# Patient Record
Sex: MALE | Race: WHITE
[De-identification: names, ages, dates, MRNs, and addresses within clinical notes are randomized per-mention and may not be internally consistent; named-entity substitution may affect disease eponyms.]

---

## 2018-02-28 ENCOUNTER — HOSPITAL ENCOUNTER (OUTPATIENT)
Age: 38
End: 2018-02-28
Payer: MEDICARE

## 2018-02-28 DIAGNOSIS — F11.20: Primary | ICD-10-CM

## 2018-02-28 LAB
AMPHET UR-MCNC: NEGATIVE NG/ML
BARBITURATE URINE VISTA: NEGATIVE
BENZODIAZEPINE URINE VISTA: NEGATIVE
COCAINE URINE VISTA: NEGATIVE
DRUG CONFIRMATION TO FOLLOW?: (no result)
ECSTACY URINE VISTA: NEGATIVE
METHADONE URINE VISTA: NEGATIVE
PCP UR QL: NEGATIVE
PH UR: 7 [PH]
THC URINE VISTA: NEGATIVE

## 2018-02-28 PROCEDURE — 80307 DRUG TEST PRSMV CHEM ANLYZR: CPT

## 2019-01-15 ENCOUNTER — HOSPITAL ENCOUNTER (INPATIENT)
Age: 39
LOS: 2 days | Discharge: HOME | DRG: 92 | End: 2019-01-17
Payer: MEDICARE

## 2019-01-15 VITALS
RESPIRATION RATE: 16 BRPM | TEMPERATURE: 97.8 F | DIASTOLIC BLOOD PRESSURE: 68 MMHG | SYSTOLIC BLOOD PRESSURE: 114 MMHG | HEART RATE: 86 BPM | OXYGEN SATURATION: 99 %

## 2019-01-15 VITALS
DIASTOLIC BLOOD PRESSURE: 76 MMHG | OXYGEN SATURATION: 100 % | RESPIRATION RATE: 16 BRPM | TEMPERATURE: 98.3 F | HEART RATE: 82 BPM | SYSTOLIC BLOOD PRESSURE: 111 MMHG

## 2019-01-15 VITALS
HEART RATE: 83 BPM | DIASTOLIC BLOOD PRESSURE: 78 MMHG | OXYGEN SATURATION: 100 % | TEMPERATURE: 98.24 F | RESPIRATION RATE: 16 BRPM | SYSTOLIC BLOOD PRESSURE: 119 MMHG

## 2019-01-15 VITALS
OXYGEN SATURATION: 99 % | HEART RATE: 83 BPM | RESPIRATION RATE: 16 BRPM | TEMPERATURE: 98.42 F | SYSTOLIC BLOOD PRESSURE: 120 MMHG | DIASTOLIC BLOOD PRESSURE: 71 MMHG

## 2019-01-15 VITALS — BODY MASS INDEX: 29.9 KG/M2 | BODY MASS INDEX: 26.9 KG/M2

## 2019-01-15 DIAGNOSIS — Z72.0: ICD-10-CM

## 2019-01-15 DIAGNOSIS — F07.81: ICD-10-CM

## 2019-01-15 DIAGNOSIS — S79.912S: ICD-10-CM

## 2019-01-15 DIAGNOSIS — F90.9: ICD-10-CM

## 2019-01-15 DIAGNOSIS — S79.922S: ICD-10-CM

## 2019-01-15 DIAGNOSIS — G89.4: Primary | ICD-10-CM

## 2019-01-15 DIAGNOSIS — M79.652: ICD-10-CM

## 2019-01-15 DIAGNOSIS — M25.552: ICD-10-CM

## 2019-01-15 DIAGNOSIS — T85.620A: ICD-10-CM

## 2019-01-15 DIAGNOSIS — F32.9: ICD-10-CM
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
## 2019-01-15 PROCEDURE — 36415 COLL VENOUS BLD VENIPUNCTURE: CPT

## 2019-01-15 PROCEDURE — 85025 COMPLETE CBC W/AUTO DIFF WBC: CPT

## 2019-01-15 PROCEDURE — 86140 C-REACTIVE PROTEIN: CPT

## 2019-01-15 PROCEDURE — 85652 RBC SED RATE AUTOMATED: CPT

## 2019-01-16 VITALS
TEMPERATURE: 98.2 F | DIASTOLIC BLOOD PRESSURE: 82 MMHG | RESPIRATION RATE: 16 BRPM | OXYGEN SATURATION: 100 % | HEART RATE: 77 BPM | SYSTOLIC BLOOD PRESSURE: 128 MMHG

## 2019-01-16 VITALS
HEART RATE: 84 BPM | DIASTOLIC BLOOD PRESSURE: 83 MMHG | SYSTOLIC BLOOD PRESSURE: 134 MMHG | OXYGEN SATURATION: 98 % | RESPIRATION RATE: 16 BRPM

## 2019-01-16 VITALS — RESPIRATION RATE: 16 BRPM | OXYGEN SATURATION: 96 %

## 2019-01-16 VITALS
OXYGEN SATURATION: 100 % | DIASTOLIC BLOOD PRESSURE: 82 MMHG | RESPIRATION RATE: 16 BRPM | TEMPERATURE: 98.1 F | HEART RATE: 80 BPM | SYSTOLIC BLOOD PRESSURE: 131 MMHG

## 2019-01-16 VITALS
SYSTOLIC BLOOD PRESSURE: 117 MMHG | RESPIRATION RATE: 16 BRPM | HEART RATE: 70 BPM | TEMPERATURE: 98.24 F | DIASTOLIC BLOOD PRESSURE: 65 MMHG | OXYGEN SATURATION: 100 %

## 2019-01-16 VITALS
SYSTOLIC BLOOD PRESSURE: 120 MMHG | HEART RATE: 62 BPM | DIASTOLIC BLOOD PRESSURE: 62 MMHG | OXYGEN SATURATION: 100 % | TEMPERATURE: 97.88 F | RESPIRATION RATE: 16 BRPM

## 2019-01-16 VITALS
HEART RATE: 80 BPM | SYSTOLIC BLOOD PRESSURE: 127 MMHG | DIASTOLIC BLOOD PRESSURE: 77 MMHG | OXYGEN SATURATION: 100 % | RESPIRATION RATE: 16 BRPM

## 2019-01-16 VITALS
RESPIRATION RATE: 16 BRPM | SYSTOLIC BLOOD PRESSURE: 134 MMHG | HEART RATE: 90 BPM | DIASTOLIC BLOOD PRESSURE: 74 MMHG | OXYGEN SATURATION: 100 %

## 2019-01-16 VITALS
RESPIRATION RATE: 16 BRPM | DIASTOLIC BLOOD PRESSURE: 83 MMHG | SYSTOLIC BLOOD PRESSURE: 111 MMHG | HEART RATE: 77 BPM | OXYGEN SATURATION: 100 % | TEMPERATURE: 97.88 F

## 2019-01-16 VITALS
TEMPERATURE: 97.88 F | DIASTOLIC BLOOD PRESSURE: 86 MMHG | RESPIRATION RATE: 16 BRPM | SYSTOLIC BLOOD PRESSURE: 116 MMHG | OXYGEN SATURATION: 100 % | HEART RATE: 78 BPM

## 2019-01-16 VITALS
DIASTOLIC BLOOD PRESSURE: 76 MMHG | SYSTOLIC BLOOD PRESSURE: 137 MMHG | RESPIRATION RATE: 16 BRPM | OXYGEN SATURATION: 98 % | HEART RATE: 77 BPM

## 2019-01-16 VITALS
HEART RATE: 73 BPM | RESPIRATION RATE: 16 BRPM | TEMPERATURE: 98.42 F | OXYGEN SATURATION: 100 % | DIASTOLIC BLOOD PRESSURE: 73 MMHG | SYSTOLIC BLOOD PRESSURE: 119 MMHG

## 2019-01-16 VITALS
RESPIRATION RATE: 16 BRPM | DIASTOLIC BLOOD PRESSURE: 87 MMHG | HEART RATE: 89 BPM | SYSTOLIC BLOOD PRESSURE: 147 MMHG | OXYGEN SATURATION: 99 %

## 2019-01-16 VITALS
HEART RATE: 90 BPM | SYSTOLIC BLOOD PRESSURE: 139 MMHG | OXYGEN SATURATION: 100 % | DIASTOLIC BLOOD PRESSURE: 82 MMHG | TEMPERATURE: 98.1 F | RESPIRATION RATE: 16 BRPM

## 2019-01-16 VITALS
OXYGEN SATURATION: 99 % | DIASTOLIC BLOOD PRESSURE: 85 MMHG | RESPIRATION RATE: 16 BRPM | SYSTOLIC BLOOD PRESSURE: 142 MMHG | HEART RATE: 78 BPM

## 2019-01-16 VITALS
DIASTOLIC BLOOD PRESSURE: 69 MMHG | RESPIRATION RATE: 16 BRPM | OXYGEN SATURATION: 96 % | SYSTOLIC BLOOD PRESSURE: 131 MMHG | HEART RATE: 92 BPM

## 2019-01-16 VITALS
TEMPERATURE: 98.24 F | OXYGEN SATURATION: 100 % | DIASTOLIC BLOOD PRESSURE: 68 MMHG | RESPIRATION RATE: 16 BRPM | HEART RATE: 74 BPM | SYSTOLIC BLOOD PRESSURE: 123 MMHG

## 2019-01-16 VITALS — OXYGEN SATURATION: 99 % | RESPIRATION RATE: 16 BRPM

## 2019-01-16 VITALS
SYSTOLIC BLOOD PRESSURE: 142 MMHG | HEART RATE: 78 BPM | DIASTOLIC BLOOD PRESSURE: 86 MMHG | RESPIRATION RATE: 16 BRPM | TEMPERATURE: 98.06 F | OXYGEN SATURATION: 100 %

## 2019-01-16 VITALS
SYSTOLIC BLOOD PRESSURE: 126 MMHG | OXYGEN SATURATION: 100 % | RESPIRATION RATE: 16 BRPM | HEART RATE: 82 BPM | DIASTOLIC BLOOD PRESSURE: 88 MMHG | TEMPERATURE: 98.4 F

## 2019-01-16 VITALS — RESPIRATION RATE: 16 BRPM

## 2019-01-16 LAB
CRP SERPL-MCNC: < 2.9 MG/L (ref 0–3)
DEPRECATED RDW RBC: 44 FL (ref 35.1–43.9)
DIFFERENTIAL INDICATED: (no result)
ERYTHROCYTE [DISTWIDTH] IN BLOOD: 13.2 % (ref 11.6–14.6)
HCT VFR BLD AUTO: 47.5 % (ref 40–54)
HEMOGLOBIN: 16.5 G/DL (ref 13–16.5)
HGB BLD-MCNC: 16.5 G/DL (ref 13–16.5)
IMMATURE GRANULOCYTES COUNT: 0.03 X10^3/UL (ref 0–0)
MCV RBC: 90.8 FL (ref 80–94)
MEAN CORP HGB CONC: 34.7 G/GL (ref 32–36)
MEAN PLATELET VOL.: 9.8 FL (ref 6.2–12)
PLATELET # BLD: 285 K/MM3 (ref 150–450)
PLATELET COUNT: 285 K/MM3 (ref 150–450)
POSITIVE COUNT: NO
POSITIVE DIFFERENTIAL: NO
POSITIVE MORPHOLOGY: NO
RBC # BLD AUTO: 5.23 M/MM3 (ref 4.6–6.2)
RBC DISTRIBUTION WIDTH CV: 13.2 % (ref 11.6–14.6)
RBC DISTRIBUTION WIDTH SD: 44 FL (ref 35.1–43.9)
WBC # BLD AUTO: 14.8 K/MM3 (ref 4.4–11)
WHITE BLOOD COUNT: 14.8 K/MM3 (ref 4.4–11)

## 2019-01-17 VITALS
TEMPERATURE: 97.5 F | DIASTOLIC BLOOD PRESSURE: 87 MMHG | HEART RATE: 81 BPM | RESPIRATION RATE: 16 BRPM | OXYGEN SATURATION: 99 % | SYSTOLIC BLOOD PRESSURE: 124 MMHG

## 2019-01-17 VITALS
DIASTOLIC BLOOD PRESSURE: 79 MMHG | TEMPERATURE: 97.6 F | HEART RATE: 103 BPM | RESPIRATION RATE: 18 BRPM | OXYGEN SATURATION: 99 % | SYSTOLIC BLOOD PRESSURE: 129 MMHG

## 2019-02-22 ENCOUNTER — HOSPITAL ENCOUNTER (OUTPATIENT)
Dept: HOSPITAL 100 - SDC | Age: 39
Discharge: HOME | End: 2019-02-22
Payer: MEDICARE

## 2019-02-22 VITALS
HEART RATE: 69 BPM | DIASTOLIC BLOOD PRESSURE: 82 MMHG | TEMPERATURE: 98.42 F | SYSTOLIC BLOOD PRESSURE: 112 MMHG | RESPIRATION RATE: 16 BRPM | OXYGEN SATURATION: 100 %

## 2019-02-22 VITALS
DIASTOLIC BLOOD PRESSURE: 75 MMHG | TEMPERATURE: 98.24 F | OXYGEN SATURATION: 100 % | RESPIRATION RATE: 16 BRPM | SYSTOLIC BLOOD PRESSURE: 112 MMHG | HEART RATE: 68 BPM

## 2019-02-22 VITALS
RESPIRATION RATE: 16 BRPM | DIASTOLIC BLOOD PRESSURE: 75 MMHG | TEMPERATURE: 97.88 F | HEART RATE: 73 BPM | OXYGEN SATURATION: 100 % | SYSTOLIC BLOOD PRESSURE: 112 MMHG

## 2019-02-22 VITALS
HEART RATE: 71 BPM | OXYGEN SATURATION: 100 % | SYSTOLIC BLOOD PRESSURE: 127 MMHG | TEMPERATURE: 98.9 F | RESPIRATION RATE: 16 BRPM | DIASTOLIC BLOOD PRESSURE: 69 MMHG

## 2019-02-22 VITALS
SYSTOLIC BLOOD PRESSURE: 108 MMHG | RESPIRATION RATE: 16 BRPM | HEART RATE: 74 BPM | DIASTOLIC BLOOD PRESSURE: 89 MMHG | OXYGEN SATURATION: 100 %

## 2019-02-22 VITALS — BODY MASS INDEX: 26.9 KG/M2 | BODY MASS INDEX: 27.1 KG/M2

## 2019-02-22 VITALS
OXYGEN SATURATION: 100 % | RESPIRATION RATE: 16 BRPM | SYSTOLIC BLOOD PRESSURE: 118 MMHG | HEART RATE: 70 BPM | DIASTOLIC BLOOD PRESSURE: 75 MMHG

## 2019-02-22 DIAGNOSIS — Z79.891: ICD-10-CM

## 2019-02-22 DIAGNOSIS — G89.4: Primary | ICD-10-CM

## 2019-02-22 DIAGNOSIS — M54.16: ICD-10-CM

## 2019-02-22 DIAGNOSIS — Z79.2: ICD-10-CM

## 2019-02-22 DIAGNOSIS — Z79.899: ICD-10-CM

## 2019-02-22 DIAGNOSIS — F17.220: ICD-10-CM

## 2019-02-22 DIAGNOSIS — M54.5: ICD-10-CM

## 2019-02-22 DIAGNOSIS — M96.1: ICD-10-CM

## 2019-02-22 PROCEDURE — 72100 X-RAY EXAM L-S SPINE 2/3 VWS: CPT

## 2019-02-22 PROCEDURE — 76000 FLUOROSCOPY <1 HR PHYS/QHP: CPT

## 2019-02-22 PROCEDURE — 62362 IMPLANT SPINE INFUSION PUMP: CPT

## 2019-02-22 PROCEDURE — 62350 IMPLANT SPINAL CANAL CATH: CPT

## 2019-02-22 RX ADMIN — CEFAZOLIN 150 GM: 10 INJECTION, POWDER, FOR SOLUTION INTRAVENOUS at 15:27

## 2019-02-22 RX ADMIN — VANCOMYCIN HYDROCHLORIDE 200 MG: 1 INJECTION, SOLUTION INTRAVENOUS at 15:22

## 2020-04-11 ENCOUNTER — HOSPITAL ENCOUNTER (EMERGENCY)
Dept: HOSPITAL 100 - ED | Age: 40
Discharge: HOME | End: 2020-04-11
Payer: MEDICARE

## 2020-04-11 VITALS
SYSTOLIC BLOOD PRESSURE: 158 MMHG | DIASTOLIC BLOOD PRESSURE: 101 MMHG | OXYGEN SATURATION: 99 % | TEMPERATURE: 98.1 F | RESPIRATION RATE: 18 BRPM | HEART RATE: 103 BPM

## 2020-04-11 VITALS — BODY MASS INDEX: 27.1 KG/M2 | BODY MASS INDEX: 31.6 KG/M2

## 2020-04-11 DIAGNOSIS — L30.9: Primary | ICD-10-CM

## 2020-04-11 DIAGNOSIS — Z72.0: ICD-10-CM

## 2020-04-11 DIAGNOSIS — F41.9: ICD-10-CM

## 2020-04-11 DIAGNOSIS — R03.0: ICD-10-CM

## 2020-04-11 LAB
ANION GAP: 7 (ref 5–15)
BUN SERPL-MCNC: 9 MG/DL (ref 7–18)
BUN/CREAT RATIO: 15 RATIO (ref 10–20)
CALCIUM SERPL-MCNC: 9 MG/DL (ref 8.5–10.1)
CARBON DIOXIDE: 25 MMOL/L (ref 21–32)
CHLORIDE: 106 MMOL/L (ref 98–107)
DEPRECATED RDW RBC: 41.9 FL (ref 35.1–43.9)
ERYTHROCYTE [DISTWIDTH] IN BLOOD: 12.8 % (ref 11.6–14.6)
EST GLOM FILT RATE - AFR AMER: 194 ML/MIN (ref 60–?)
ESTIMATED CREATININE CLEARANCE: 168.57 ML/MIN
GLUCOSE: 89 MG/DL (ref 74–106)
HCT VFR BLD AUTO: 44.3 % (ref 40–54)
HEMOGLOBIN: 15.2 G/DL (ref 13–16.5)
HGB BLD-MCNC: 15.2 G/DL (ref 13–16.5)
IMMATURE GRANULOCYTES COUNT: 0.03 X10^3/UL (ref 0–0)
MCV RBC: 89.5 FL (ref 80–94)
MEAN CORP HGB CONC: 34.3 G/DL (ref 32–36)
MEAN PLATELET VOL.: 9.8 FL (ref 6.2–12)
NRBC FLAGGED BY ANALYZER: 0 % (ref 0–5)
PLATELET # BLD: 226 K/MM3 (ref 150–450)
PLATELET COUNT: 226 K/MM3 (ref 150–450)
POTASSIUM: 4.2 MMOL/L (ref 3.5–5.1)
RBC # BLD AUTO: 4.95 M/MM3 (ref 4.6–6.2)
RBC DISTRIBUTION WIDTH CV: 12.8 % (ref 11.6–14.6)
RBC DISTRIBUTION WIDTH SD: 41.9 FL (ref 35.1–43.9)
WBC # BLD AUTO: 8.8 K/MM3 (ref 4.4–11)
WHITE BLOOD COUNT: 8.8 K/MM3 (ref 4.4–11)

## 2020-04-11 PROCEDURE — 96374 THER/PROPH/DIAG INJ IV PUSH: CPT

## 2020-04-11 PROCEDURE — 93005 ELECTROCARDIOGRAM TRACING: CPT

## 2020-04-11 PROCEDURE — 85025 COMPLETE CBC W/AUTO DIFF WBC: CPT

## 2020-04-11 PROCEDURE — 70450 CT HEAD/BRAIN W/O DYE: CPT

## 2020-04-11 PROCEDURE — A4216 STERILE WATER/SALINE, 10 ML: HCPCS

## 2020-04-11 PROCEDURE — 96361 HYDRATE IV INFUSION ADD-ON: CPT

## 2020-04-11 PROCEDURE — 99285 EMERGENCY DEPT VISIT HI MDM: CPT

## 2020-04-11 PROCEDURE — 80048 BASIC METABOLIC PNL TOTAL CA: CPT

## 2020-04-11 RX ADMIN — SODIUM CHLORIDE 150 ML: 9 INJECTION, SOLUTION INTRAVENOUS at 12:04

## 2020-06-08 ENCOUNTER — HOSPITAL ENCOUNTER (EMERGENCY)
Age: 40
Discharge: HOME | End: 2020-06-08
Payer: MEDICARE

## 2020-06-08 VITALS
RESPIRATION RATE: 16 BRPM | SYSTOLIC BLOOD PRESSURE: 125 MMHG | HEART RATE: 98 BPM | OXYGEN SATURATION: 98 % | DIASTOLIC BLOOD PRESSURE: 85 MMHG

## 2020-06-08 VITALS
RESPIRATION RATE: 20 BRPM | HEART RATE: 147 BPM | SYSTOLIC BLOOD PRESSURE: 192 MMHG | TEMPERATURE: 97.88 F | DIASTOLIC BLOOD PRESSURE: 105 MMHG | OXYGEN SATURATION: 99 %

## 2020-06-08 VITALS
SYSTOLIC BLOOD PRESSURE: 125 MMHG | RESPIRATION RATE: 16 BRPM | HEART RATE: 111 BPM | OXYGEN SATURATION: 99 % | TEMPERATURE: 97.88 F | DIASTOLIC BLOOD PRESSURE: 71 MMHG

## 2020-06-08 VITALS — BODY MASS INDEX: 29 KG/M2 | BODY MASS INDEX: 31.6 KG/M2

## 2020-06-08 DIAGNOSIS — F15.10: Primary | ICD-10-CM

## 2020-06-08 DIAGNOSIS — G89.29: ICD-10-CM

## 2020-06-08 DIAGNOSIS — Z79.891: ICD-10-CM

## 2020-06-08 LAB
ALANINE AMINOTRANSFER ALT/SGPT: 50 U/L (ref 16–61)
ALBUMIN SERPL-MCNC: 4.4 G/DL (ref 3.2–5)
ALCOHOL, BLOOD (MEDICAL)-SERUM: < 3 MG/DL
ALKALINE PHOSPHATASE: 94 U/L (ref 45–117)
ANION GAP: 7 (ref 5–15)
AST(SGOT): 31 U/L (ref 15–37)
BUN SERPL-MCNC: 18 MG/DL (ref 7–18)
BUN/CREAT RATIO: 21.6 RATIO (ref 10–20)
CALCIUM SERPL-MCNC: 9.3 MG/DL (ref 8.5–10.1)
CARBON DIOXIDE: 26 MMOL/L (ref 21–32)
CHLORIDE: 105 MMOL/L (ref 98–107)
DEPRECATED RDW RBC: 41.6 FL (ref 35.1–43.9)
ERYTHROCYTE [DISTWIDTH] IN BLOOD: 12.9 % (ref 11.6–14.6)
EST GLOM FILT RATE - AFR AMER: 133 ML/MIN (ref 60–?)
ESTIMATED CREATININE CLEARANCE: 109.96 ML/MIN
GLOBULIN: 3.7 G/DL (ref 2.2–4.2)
GLUCOSE: 93 MG/DL (ref 74–106)
HCT VFR BLD AUTO: 46.3 % (ref 40–54)
HEMOGLOBIN: 15.9 G/DL (ref 13–16.5)
HGB BLD-MCNC: 15.9 G/DL (ref 13–16.5)
IMMATURE GRANULOCYTES COUNT: 0.02 X10^3/UL (ref 0–0)
MCV RBC: 88.4 FL (ref 80–94)
MEAN CORP HGB CONC: 34.3 G/DL (ref 32–36)
MEAN PLATELET VOL.: 9.3 FL (ref 6.2–12)
NRBC FLAGGED BY ANALYZER: 0 % (ref 0–5)
PLATELET # BLD: 279 K/MM3 (ref 150–450)
PLATELET COUNT: 279 K/MM3 (ref 150–450)
POTASSIUM: 3.6 MMOL/L (ref 3.5–5.1)
RBC # BLD AUTO: 5.24 M/MM3 (ref 4.6–6.2)
RBC DISTRIBUTION WIDTH CV: 12.9 % (ref 11.6–14.6)
RBC DISTRIBUTION WIDTH SD: 41.6 FL (ref 35.1–43.9)
WBC # BLD AUTO: 8.3 K/MM3 (ref 4.4–11)
WHITE BLOOD COUNT: 8.3 K/MM3 (ref 4.4–11)

## 2020-06-08 PROCEDURE — 96374 THER/PROPH/DIAG INJ IV PUSH: CPT

## 2020-06-08 PROCEDURE — 93005 ELECTROCARDIOGRAM TRACING: CPT

## 2020-06-08 PROCEDURE — 99285 EMERGENCY DEPT VISIT HI MDM: CPT

## 2020-06-08 PROCEDURE — 85025 COMPLETE CBC W/AUTO DIFF WBC: CPT

## 2020-06-08 PROCEDURE — 96361 HYDRATE IV INFUSION ADD-ON: CPT

## 2020-06-08 PROCEDURE — 80320 DRUG SCREEN QUANTALCOHOLS: CPT

## 2020-06-08 PROCEDURE — G0480 DRUG TEST DEF 1-7 CLASSES: HCPCS

## 2020-06-08 PROCEDURE — 80053 COMPREHEN METABOLIC PANEL: CPT

## 2020-07-10 ENCOUNTER — HOSPITAL ENCOUNTER (EMERGENCY)
Dept: HOSPITAL 100 - ED | Age: 40
Discharge: LEFT BEFORE DISCHARGE | End: 2020-07-10
Payer: MEDICARE

## 2020-07-10 ENCOUNTER — HOSPITAL ENCOUNTER (EMERGENCY)
Age: 40
LOS: 1 days | Discharge: HOME | End: 2020-07-11
Payer: MEDICARE

## 2020-07-10 VITALS
TEMPERATURE: 97.88 F | RESPIRATION RATE: 20 BRPM | HEART RATE: 127 BPM | OXYGEN SATURATION: 100 % | DIASTOLIC BLOOD PRESSURE: 99 MMHG | SYSTOLIC BLOOD PRESSURE: 154 MMHG

## 2020-07-10 VITALS
RESPIRATION RATE: 18 BRPM | DIASTOLIC BLOOD PRESSURE: 74 MMHG | HEART RATE: 96 BPM | SYSTOLIC BLOOD PRESSURE: 104 MMHG | OXYGEN SATURATION: 93 %

## 2020-07-10 VITALS
RESPIRATION RATE: 16 BRPM | OXYGEN SATURATION: 98 % | SYSTOLIC BLOOD PRESSURE: 104 MMHG | HEART RATE: 83 BPM | DIASTOLIC BLOOD PRESSURE: 72 MMHG

## 2020-07-10 VITALS
DIASTOLIC BLOOD PRESSURE: 73 MMHG | HEART RATE: 105 BPM | OXYGEN SATURATION: 94 % | SYSTOLIC BLOOD PRESSURE: 118 MMHG | RESPIRATION RATE: 15 BRPM

## 2020-07-10 VITALS
OXYGEN SATURATION: 98 % | HEART RATE: 130 BPM | SYSTOLIC BLOOD PRESSURE: 154 MMHG | RESPIRATION RATE: 20 BRPM | DIASTOLIC BLOOD PRESSURE: 99 MMHG

## 2020-07-10 VITALS — BODY MASS INDEX: 33 KG/M2 | BODY MASS INDEX: 33.1 KG/M2

## 2020-07-10 VITALS — BODY MASS INDEX: 33 KG/M2 | BODY MASS INDEX: 29 KG/M2

## 2020-07-10 VITALS
OXYGEN SATURATION: 98 % | TEMPERATURE: 98.4 F | RESPIRATION RATE: 20 BRPM | SYSTOLIC BLOOD PRESSURE: 153 MMHG | DIASTOLIC BLOOD PRESSURE: 118 MMHG | HEART RATE: 139 BPM

## 2020-07-10 DIAGNOSIS — F41.9: ICD-10-CM

## 2020-07-10 DIAGNOSIS — R44.2: Primary | ICD-10-CM

## 2020-07-10 DIAGNOSIS — F41.9: Primary | ICD-10-CM

## 2020-07-10 DIAGNOSIS — R00.0: ICD-10-CM

## 2020-07-10 DIAGNOSIS — F17.200: ICD-10-CM

## 2020-07-10 LAB
ALANINE AMINOTRANSFER ALT/SGPT: 59 U/L (ref 16–61)
ALBUMIN SERPL-MCNC: 4.4 G/DL (ref 3.2–5)
ALCOHOL, BLOOD (MEDICAL)-SERUM: 3 MG/DL
ALCOHOL, BLOOD (MEDICAL)-SERUM: 4 MG/DL
ALKALINE PHOSPHATASE: 90 U/L (ref 45–117)
AMPHET UR-MCNC: POSITIVE NG/ML
ANION GAP: 8 (ref 5–15)
AST(SGOT): 42 U/L (ref 15–37)
BARBITURATE URINE VISTA: NEGATIVE
BENZODIAZEPINE URINE VISTA: NEGATIVE
BILIRUB UR QL STRIP: 1 MG/DL
BUN SERPL-MCNC: 6 MG/DL (ref 7–18)
BUN/CREAT RATIO: 7.7 RATIO (ref 10–20)
CALCIUM SERPL-MCNC: 9 MG/DL (ref 8.5–10.1)
CARBON DIOXIDE: 23 MMOL/L (ref 21–32)
CHLORIDE: 105 MMOL/L (ref 98–107)
COCAINE URINE VISTA: NEGATIVE
DEPRECATED RDW RBC: 40.1 FL (ref 35.1–43.9)
DRUG CONFIRMATION TO FOLLOW?: (no result)
ECSTACY URINE VISTA: NEGATIVE
ERYTHROCYTE [DISTWIDTH] IN BLOOD: 13 % (ref 11.6–14.6)
EST GLOM FILT RATE - AFR AMER: 143 ML/MIN (ref 60–?)
ESTIMATED CREATININE CLEARANCE: 117.01 ML/MIN
GLOBULIN: 3.7 G/DL (ref 2.2–4.2)
GLUCOSE: 110 MG/DL (ref 74–106)
HCT VFR BLD AUTO: 46.5 % (ref 40–54)
HEMOGLOBIN: 16.2 G/DL (ref 13–16.5)
HGB BLD-MCNC: 16.2 G/DL (ref 13–16.5)
IMMATURE GRANULOCYTES COUNT: 0.03 X10^3/UL (ref 0–0)
KETONE-DIPSTICK: 50 MG/DL
LEUKOCYTE ESTERASE UR QL STRIP: 25 /UL
MCV RBC: 86.6 FL (ref 80–94)
MEAN CORP HGB CONC: 34.8 G/DL (ref 32–36)
MEAN PLATELET VOL.: 9.6 FL (ref 6.2–12)
METHADONE URINE VISTA: NEGATIVE
MUCOUS THREADS URNS QL MICRO: (no result) /HPF
NRBC FLAGGED BY ANALYZER: 0 % (ref 0–5)
PCP UR QL: NEGATIVE
PH UR: 6 [PH]
PLATELET # BLD: 361 K/MM3 (ref 150–450)
PLATELET COUNT: 361 K/MM3 (ref 150–450)
POTASSIUM: 3.6 MMOL/L (ref 3.5–5.1)
PROT UR QL STRIP.AUTO: 30 MG/DL
RBC # BLD AUTO: 5.37 M/MM3 (ref 4.6–6.2)
RBC DISTRIBUTION WIDTH CV: 13 % (ref 11.6–14.6)
RBC DISTRIBUTION WIDTH SD: 40.1 FL (ref 35.1–43.9)
RBC UR QL: (no result) /HPF (ref 0–5)
SP GR UR: 1.01 (ref 1–1.03)
SQUAMOUS URNS QL MICRO: (no result) /HPF (ref 0–5)
THC URINE VISTA: NEGATIVE
URINE PRESERVATIVE: (no result)
WBC # BLD AUTO: 7.9 K/MM3 (ref 4.4–11)
WHITE BLOOD COUNT: 7.9 K/MM3 (ref 4.4–11)

## 2020-07-10 PROCEDURE — 36415 COLL VENOUS BLD VENIPUNCTURE: CPT

## 2020-07-10 PROCEDURE — 80320 DRUG SCREEN QUANTALCOHOLS: CPT

## 2020-07-10 PROCEDURE — 80307 DRUG TEST PRSMV CHEM ANLYZR: CPT

## 2020-07-10 PROCEDURE — 99282 EMERGENCY DEPT VISIT SF MDM: CPT

## 2020-07-10 PROCEDURE — 71045 X-RAY EXAM CHEST 1 VIEW: CPT

## 2020-07-10 PROCEDURE — 85025 COMPLETE CBC W/AUTO DIFF WBC: CPT

## 2020-07-10 PROCEDURE — G0480 DRUG TEST DEF 1-7 CLASSES: HCPCS

## 2020-07-10 PROCEDURE — 80053 COMPREHEN METABOLIC PANEL: CPT

## 2020-07-10 PROCEDURE — 84484 ASSAY OF TROPONIN QUANT: CPT

## 2020-07-10 PROCEDURE — 96372 THER/PROPH/DIAG INJ SC/IM: CPT

## 2020-07-10 PROCEDURE — 70450 CT HEAD/BRAIN W/O DYE: CPT

## 2020-07-10 PROCEDURE — 81001 URINALYSIS AUTO W/SCOPE: CPT

## 2020-07-10 PROCEDURE — 84443 ASSAY THYROID STIM HORMONE: CPT

## 2020-07-10 PROCEDURE — A4216 STERILE WATER/SALINE, 10 ML: HCPCS

## 2020-07-10 PROCEDURE — 99285 EMERGENCY DEPT VISIT HI MDM: CPT

## 2020-07-10 PROCEDURE — 93005 ELECTROCARDIOGRAM TRACING: CPT

## 2020-07-11 VITALS
DIASTOLIC BLOOD PRESSURE: 75 MMHG | RESPIRATION RATE: 16 BRPM | SYSTOLIC BLOOD PRESSURE: 133 MMHG | OXYGEN SATURATION: 95 % | HEART RATE: 85 BPM

## 2020-07-11 VITALS
SYSTOLIC BLOOD PRESSURE: 118 MMHG | HEART RATE: 107 BPM | RESPIRATION RATE: 18 BRPM | OXYGEN SATURATION: 97 % | DIASTOLIC BLOOD PRESSURE: 63 MMHG

## 2020-07-11 VITALS
SYSTOLIC BLOOD PRESSURE: 124 MMHG | HEART RATE: 88 BPM | RESPIRATION RATE: 16 BRPM | OXYGEN SATURATION: 95 % | DIASTOLIC BLOOD PRESSURE: 76 MMHG

## 2020-07-11 VITALS — RESPIRATION RATE: 16 BRPM

## 2020-07-11 VITALS
OXYGEN SATURATION: 96 % | HEART RATE: 88 BPM | DIASTOLIC BLOOD PRESSURE: 88 MMHG | RESPIRATION RATE: 16 BRPM | SYSTOLIC BLOOD PRESSURE: 125 MMHG

## 2021-03-22 ENCOUNTER — HOSPITAL ENCOUNTER (OUTPATIENT)
Dept: HOSPITAL 100 - RAD | Age: 41
End: 2021-03-22
Payer: MEDICARE

## 2021-03-22 VITALS — BODY MASS INDEX: 33.1 KG/M2

## 2021-03-22 DIAGNOSIS — M54.2: Primary | ICD-10-CM

## 2021-03-22 PROCEDURE — 72070 X-RAY EXAM THORAC SPINE 2VWS: CPT

## 2021-03-22 PROCEDURE — 72040 X-RAY EXAM NECK SPINE 2-3 VW: CPT

## 2021-11-11 ENCOUNTER — HOSPITAL ENCOUNTER (OUTPATIENT)
Age: 41
End: 2021-11-11
Payer: MEDICARE

## 2021-11-11 DIAGNOSIS — M54.2: Primary | ICD-10-CM

## 2021-11-11 PROCEDURE — 72141 MRI NECK SPINE W/O DYE: CPT

## 2022-08-17 ENCOUNTER — HOSPITAL ENCOUNTER (OUTPATIENT)
Dept: HOSPITAL 100 - LAB | Age: 42
Discharge: HOME | End: 2022-08-17
Payer: MEDICARE

## 2022-08-17 DIAGNOSIS — F11.20: Primary | ICD-10-CM

## 2022-08-17 PROCEDURE — 80307 DRUG TEST PRSMV CHEM ANLYZR: CPT

## 2023-01-31 ENCOUNTER — HOSPITAL ENCOUNTER (OUTPATIENT)
Dept: HOSPITAL 100 - LAB | Age: 43
Discharge: HOME | End: 2023-01-31
Payer: MEDICARE

## 2023-01-31 DIAGNOSIS — F11.20: Primary | ICD-10-CM

## 2023-01-31 LAB
AMPHET UR-MCNC: NEGATIVE NG/ML
BARBITURATE URINE VISTA: NEGATIVE
BENZODIAZEPINE URINE VISTA: NEGATIVE
COCAINE URINE VISTA: NEGATIVE
DRUG CONFIRMATION TO FOLLOW?: (no result)
ECSTACY URINE VISTA: NEGATIVE
METHADONE URINE VISTA: NEGATIVE
PCP UR QL: NEGATIVE
PH UR: 5 [PH]
THC URINE VISTA: POSITIVE

## 2023-01-31 PROCEDURE — 80307 DRUG TEST PRSMV CHEM ANLYZR: CPT

## 2024-04-26 DIAGNOSIS — Z87.898 HISTORY OF ABNORMAL WEIGHT LOSS: Primary | ICD-10-CM

## 2024-06-17 ENCOUNTER — APPOINTMENT (OUTPATIENT)
Dept: GASTROENTEROLOGY | Facility: CLINIC | Age: 44
End: 2024-06-17
Payer: COMMERCIAL

## 2024-09-04 ENCOUNTER — LAB (OUTPATIENT)
Dept: LAB | Facility: LAB | Age: 44
End: 2024-09-04
Payer: COMMERCIAL

## 2024-09-04 DIAGNOSIS — Z87.898 HISTORY OF ABNORMAL WEIGHT LOSS: ICD-10-CM

## 2024-09-04 LAB
ALBUMIN SERPL BCP-MCNC: 4.2 G/DL (ref 3.4–5)
ALP SERPL-CCNC: 84 U/L (ref 33–120)
ALT SERPL W P-5'-P-CCNC: 11 U/L (ref 10–52)
ANION GAP SERPL CALC-SCNC: 11 MMOL/L (ref 10–20)
AST SERPL W P-5'-P-CCNC: 16 U/L (ref 9–39)
BASOPHILS # BLD AUTO: 0.03 X10*3/UL (ref 0–0.1)
BASOPHILS NFR BLD AUTO: 0.6 %
BILIRUB SERPL-MCNC: 0.3 MG/DL (ref 0–1.2)
BUN SERPL-MCNC: 16 MG/DL (ref 6–23)
CALCIUM SERPL-MCNC: 9 MG/DL (ref 8.6–10.3)
CHLORIDE SERPL-SCNC: 105 MMOL/L (ref 98–107)
CO2 SERPL-SCNC: 30 MMOL/L (ref 21–32)
CREAT SERPL-MCNC: 0.74 MG/DL (ref 0.5–1.3)
CRP SERPL-MCNC: 0.88 MG/DL
EGFRCR SERPLBLD CKD-EPI 2021: >90 ML/MIN/1.73M*2
EOSINOPHIL # BLD AUTO: 0.16 X10*3/UL (ref 0–0.7)
EOSINOPHIL NFR BLD AUTO: 3.1 %
ERYTHROCYTE [DISTWIDTH] IN BLOOD BY AUTOMATED COUNT: 13.2 % (ref 11.5–14.5)
GLIADIN PEPTIDE IGA SER IA-ACNC: <1 U/ML
GLUCOSE SERPL-MCNC: 88 MG/DL (ref 74–99)
HCT VFR BLD AUTO: 40.4 % (ref 41–52)
HGB BLD-MCNC: 13.1 G/DL (ref 13.5–17.5)
IMM GRANULOCYTES # BLD AUTO: 0.01 X10*3/UL (ref 0–0.7)
IMM GRANULOCYTES NFR BLD AUTO: 0.2 % (ref 0–0.9)
LYMPHOCYTES # BLD AUTO: 1.97 X10*3/UL (ref 1.2–4.8)
LYMPHOCYTES NFR BLD AUTO: 38.4 %
MCH RBC QN AUTO: 28.9 PG (ref 26–34)
MCHC RBC AUTO-ENTMCNC: 32.4 G/DL (ref 32–36)
MCV RBC AUTO: 89 FL (ref 80–100)
MONOCYTES # BLD AUTO: 0.66 X10*3/UL (ref 0.1–1)
MONOCYTES NFR BLD AUTO: 12.9 %
NEUTROPHILS # BLD AUTO: 2.3 X10*3/UL (ref 1.2–7.7)
NEUTROPHILS NFR BLD AUTO: 44.8 %
NRBC BLD-RTO: 0 /100 WBCS (ref 0–0)
PLATELET # BLD AUTO: 286 X10*3/UL (ref 150–450)
POTASSIUM SERPL-SCNC: 4.5 MMOL/L (ref 3.5–5.3)
PROT SERPL-MCNC: 6 G/DL (ref 6.4–8.2)
RBC # BLD AUTO: 4.53 X10*6/UL (ref 4.5–5.9)
SODIUM SERPL-SCNC: 141 MMOL/L (ref 136–145)
TSH SERPL-ACNC: 1.94 MIU/L (ref 0.44–3.98)
TTG IGA SER IA-ACNC: <1 U/ML
WBC # BLD AUTO: 5.1 X10*3/UL (ref 4.4–11.3)

## 2024-09-04 PROCEDURE — 36415 COLL VENOUS BLD VENIPUNCTURE: CPT

## 2024-09-04 PROCEDURE — 83516 IMMUNOASSAY NONANTIBODY: CPT

## 2024-09-04 PROCEDURE — 86038 ANTINUCLEAR ANTIBODIES: CPT

## 2024-09-05 ENCOUNTER — APPOINTMENT (OUTPATIENT)
Dept: GASTROENTEROLOGY | Facility: CLINIC | Age: 44
End: 2024-09-05
Payer: COMMERCIAL

## 2024-09-05 VITALS
SYSTOLIC BLOOD PRESSURE: 158 MMHG | HEART RATE: 89 BPM | WEIGHT: 192 LBS | HEIGHT: 66 IN | BODY MASS INDEX: 30.86 KG/M2 | DIASTOLIC BLOOD PRESSURE: 82 MMHG

## 2024-09-05 DIAGNOSIS — L40.50 PSORIATIC ARTHRITIS (MULTI): Primary | ICD-10-CM

## 2024-09-05 PROBLEM — T85.615A: Status: ACTIVE | Noted: 2024-08-08

## 2024-09-05 PROBLEM — F31.81 BIPOLAR II DISORDER, MOST RECENT EPISODE MAJOR DEPRESSIVE (MULTI): Status: ACTIVE | Noted: 2020-03-15

## 2024-09-05 PROBLEM — G89.29 CHRONIC BACK PAIN: Status: ACTIVE | Noted: 2018-11-02

## 2024-09-05 PROBLEM — M54.9 CHRONIC BACK PAIN: Status: ACTIVE | Noted: 2018-11-02

## 2024-09-05 PROBLEM — B18.2: Status: ACTIVE | Noted: 2018-11-02

## 2024-09-05 LAB — ANA SER QL HEP2 SUBST: NEGATIVE

## 2024-09-05 PROCEDURE — 4004F PT TOBACCO SCREEN RCVD TLK: CPT | Performed by: INTERNAL MEDICINE

## 2024-09-05 PROCEDURE — 3008F BODY MASS INDEX DOCD: CPT | Performed by: INTERNAL MEDICINE

## 2024-09-05 PROCEDURE — 99204 OFFICE O/P NEW MOD 45 MIN: CPT | Performed by: INTERNAL MEDICINE

## 2024-09-05 RX ORDER — DEXTROAMPHETAMINE SACCHARATE, AMPHETAMINE ASPARTATE MONOHYDRATE, DEXTROAMPHETAMINE SULFATE AND AMPHETAMINE SULFATE 7.5; 7.5; 7.5; 7.5 MG/1; MG/1; MG/1; MG/1
30 CAPSULE, EXTENDED RELEASE ORAL
COMMUNITY

## 2024-09-05 RX ORDER — SULFASALAZINE 500 MG/1
500 TABLET ORAL 2 TIMES DAILY
Qty: 60 TABLET | Refills: 3 | Status: SHIPPED | OUTPATIENT
Start: 2024-09-05 | End: 2025-09-05

## 2024-09-05 RX ORDER — METOPROLOL SUCCINATE 50 MG/1
50 TABLET, EXTENDED RELEASE ORAL
COMMUNITY
Start: 2023-03-16

## 2024-09-05 ASSESSMENT — ENCOUNTER SYMPTOMS
PSYCHIATRIC NEGATIVE: 1
CARDIOVASCULAR NEGATIVE: 1
ARTHRALGIAS: 1
NEUROLOGICAL NEGATIVE: 1
CONSTITUTIONAL NEGATIVE: 1
RESPIRATORY NEGATIVE: 1
ENDOCRINE NEGATIVE: 1
GASTROINTESTINAL NEGATIVE: 1
EYES NEGATIVE: 1
HEMATOLOGIC/LYMPHATIC NEGATIVE: 1
JOINT SWELLING: 1

## 2024-09-05 NOTE — PROGRESS NOTES
Subjective   Patient ID: Bennett Sorensen is a 43 y.o. male who presents for Follow-up (Patient has been gaining weight back from previously losing weight with an unknown cause. ).  KI Lopes is seen following blood work.  Is had progressive weight loss now is gaining weight.  The predominant complaint today is hands feeling stiff sore and clumsy.  He did see a family doctor a year ago was told he likely had psoriatic arthritis and did not follow-up with rheumatology as he had no transportation.  He continues to have worsening joint pain particularly his intrinsic hand joints wrist and ankles.  He denies any joint swelling or edema.  He is having no diarrhea or abdominal complaints at this time his weight is improving.    I reviewed his lab work with him.  He is not anemic liver kidney test and electrolytes are normal.  Sed rate, C-reactive protein, HI are all normal and serum celiac antibodies were negative.    Review of Systems   Constitutional: Negative.    HENT: Negative.     Eyes: Negative.    Respiratory: Negative.     Cardiovascular: Negative.    Gastrointestinal: Negative.    Endocrine: Negative.    Genitourinary: Negative.    Musculoskeletal:  Positive for arthralgias and joint swelling.   Neurological: Negative.    Hematological: Negative.    Psychiatric/Behavioral: Negative.         Objective   Physical Exam  Vitals and nursing note reviewed.   Constitutional:       Appearance: Normal appearance.   HENT:      Head: Normocephalic.      Mouth/Throat:      Mouth: Mucous membranes are moist.      Pharynx: Oropharynx is clear.   Eyes:      Pupils: Pupils are equal, round, and reactive to light.   Cardiovascular:      Rate and Rhythm: Normal rate and regular rhythm.   Pulmonary:      Effort: Pulmonary effort is normal.      Breath sounds: Normal breath sounds.   Abdominal:      General: Abdomen is flat. Bowel sounds are normal.      Palpations: Abdomen is soft.   Musculoskeletal:         General: Normal range  of motion.      Cervical back: Normal range of motion and neck supple.      Comments: Tenderness at the DIP joint and wrist.  No joint effusion no joint swelling.   Skin:     General: Skin is warm and dry.      Comments: Nailbed pitting   Neurological:      General: No focal deficit present.      Mental Status: He is alert and oriented to person, place, and time.   Psychiatric:         Mood and Affect: Mood normal.         Behavior: Behavior normal.         Assessment/Plan   Diagnoses and all orders for this visit:  Psoriatic arthritis (Multi)  -     Rheumatoid factor; Future  -     CBC and Auto Differential; Standing  -     Hepatic function panel; Standing  -     XR hand 1-2 views bilateral; Future  -     sulfaSALAzine (Azulfidine) 500 mg tablet; Take 1 tablet (500 mg) by mouth 2 times a day.  -     Referral to Rheumatology; Future  Check rheumatoid factor, refer to rheumatology patient requested Dr. Quinton Higgins in [facility].    Will check hand x-ray, rheumatoid factor.    Patient given sulfasalazine.  Reviewed allergy with patient he has not had anaphylaxis with Naprosyn developed stomachache from same.  Warned of side effects sulfasalazine need to have ongoing blood work to exclude pancytopenia or liver injury.  Will follow-up in 3 months         Darshan Sams DO 09/05/24 4:39 PM

## 2024-09-06 LAB
GLIADIN PEPTIDE IGG SER IA-ACNC: <0.56 FLU (ref 0–4.99)
TTG IGG SER IA-ACNC: <0.82 FLU (ref 0–4.99)

## 2024-09-18 ENCOUNTER — HOSPITAL ENCOUNTER (OUTPATIENT)
Dept: RADIOLOGY | Facility: HOSPITAL | Age: 44
Discharge: HOME | End: 2024-09-18
Payer: COMMERCIAL

## 2024-09-18 ENCOUNTER — LAB (OUTPATIENT)
Dept: LAB | Facility: LAB | Age: 44
End: 2024-09-18
Payer: COMMERCIAL

## 2024-09-18 DIAGNOSIS — L40.50 PSORIATIC ARTHRITIS (MULTI): ICD-10-CM

## 2024-09-18 LAB
ALBUMIN SERPL BCP-MCNC: 4.5 G/DL (ref 3.4–5)
ALP SERPL-CCNC: 70 U/L (ref 33–120)
ALT SERPL W P-5'-P-CCNC: 17 U/L (ref 10–52)
AST SERPL W P-5'-P-CCNC: 27 U/L (ref 9–39)
BASOPHILS # BLD AUTO: 0.07 X10*3/UL (ref 0–0.1)
BASOPHILS NFR BLD AUTO: 0.7 %
BILIRUB DIRECT SERPL-MCNC: 0.1 MG/DL (ref 0–0.3)
BILIRUB SERPL-MCNC: 0.4 MG/DL (ref 0–1.2)
EOSINOPHIL # BLD AUTO: 0.15 X10*3/UL (ref 0–0.7)
EOSINOPHIL NFR BLD AUTO: 1.6 %
ERYTHROCYTE [DISTWIDTH] IN BLOOD BY AUTOMATED COUNT: 12.8 % (ref 11.5–14.5)
HCT VFR BLD AUTO: 45.6 % (ref 41–52)
HGB BLD-MCNC: 14.7 G/DL (ref 13.5–17.5)
IMM GRANULOCYTES # BLD AUTO: 0.02 X10*3/UL (ref 0–0.7)
IMM GRANULOCYTES NFR BLD AUTO: 0.2 % (ref 0–0.9)
LYMPHOCYTES # BLD AUTO: 3.1 X10*3/UL (ref 1.2–4.8)
LYMPHOCYTES NFR BLD AUTO: 32.8 %
MCH RBC QN AUTO: 28.5 PG (ref 26–34)
MCHC RBC AUTO-ENTMCNC: 32.2 G/DL (ref 32–36)
MCV RBC AUTO: 88 FL (ref 80–100)
MONOCYTES # BLD AUTO: 0.9 X10*3/UL (ref 0.1–1)
MONOCYTES NFR BLD AUTO: 9.5 %
NEUTROPHILS # BLD AUTO: 5.21 X10*3/UL (ref 1.2–7.7)
NEUTROPHILS NFR BLD AUTO: 55.2 %
NRBC BLD-RTO: 0 /100 WBCS (ref 0–0)
PLATELET # BLD AUTO: 136 X10*3/UL (ref 150–450)
PROT SERPL-MCNC: 7 G/DL (ref 6.4–8.2)
RBC # BLD AUTO: 5.16 X10*6/UL (ref 4.5–5.9)
RBC MORPH BLD: NORMAL
WBC # BLD AUTO: 9.5 X10*3/UL (ref 4.4–11.3)

## 2024-09-18 PROCEDURE — 86431 RHEUMATOID FACTOR QUANT: CPT

## 2024-09-18 PROCEDURE — 85025 COMPLETE CBC W/AUTO DIFF WBC: CPT

## 2024-09-18 PROCEDURE — 36415 COLL VENOUS BLD VENIPUNCTURE: CPT

## 2024-09-18 PROCEDURE — 80076 HEPATIC FUNCTION PANEL: CPT

## 2024-09-18 PROCEDURE — 73120 X-RAY EXAM OF HAND: CPT | Mod: BILATERAL PROCEDURE | Performed by: RADIOLOGY

## 2024-09-18 PROCEDURE — 73120 X-RAY EXAM OF HAND: CPT | Mod: 50

## 2024-09-19 LAB — RHEUMATOID FACT SER NEPH-ACNC: 10 IU/ML (ref 0–15)

## 2025-01-06 ENCOUNTER — APPOINTMENT (OUTPATIENT)
Dept: GASTROENTEROLOGY | Facility: CLINIC | Age: 45
End: 2025-01-06
Payer: COMMERCIAL

## 2025-01-09 DIAGNOSIS — L40.50 PSORIATIC ARTHRITIS (MULTI): ICD-10-CM

## 2025-01-09 RX ORDER — SULFASALAZINE 500 MG/1
500 TABLET ORAL 2 TIMES DAILY
Qty: 180 TABLET | Refills: 0 | Status: SHIPPED | OUTPATIENT
Start: 2025-01-09

## 2025-01-09 NOTE — TELEPHONE ENCOUNTER
Requested Prescriptions     Pending Prescriptions Disp Refills    sulfaSALAzine (Azulfidine) 500 mg tablet [Pharmacy Med Name: SULFASALAZINE 500 MG TABLET] 180 tablet      Sig: take 1 tablet by mouth twice a day

## 2025-02-19 DIAGNOSIS — E53.8 VITAMIN B12 DEFICIENCY (NON ANEMIC): Primary | ICD-10-CM

## 2025-02-19 RX ORDER — CYANOCOBALAMIN 1000 UG/ML
1000 INJECTION, SOLUTION INTRAMUSCULAR; SUBCUTANEOUS ONCE
OUTPATIENT
Start: 2025-02-19

## 2025-02-19 RX ORDER — DIPHENHYDRAMINE HYDROCHLORIDE 50 MG/ML
50 INJECTION INTRAMUSCULAR; INTRAVENOUS AS NEEDED
OUTPATIENT
Start: 2025-02-19

## 2025-02-19 RX ORDER — FAMOTIDINE 10 MG/ML
20 INJECTION, SOLUTION INTRAVENOUS ONCE AS NEEDED
OUTPATIENT
Start: 2025-02-19

## 2025-02-19 RX ORDER — ALBUTEROL SULFATE 0.83 MG/ML
3 SOLUTION RESPIRATORY (INHALATION) AS NEEDED
OUTPATIENT
Start: 2025-02-19

## 2025-02-19 RX ORDER — EPINEPHRINE 0.3 MG/.3ML
0.3 INJECTION SUBCUTANEOUS EVERY 5 MIN PRN
OUTPATIENT
Start: 2025-02-19

## 2025-02-25 ENCOUNTER — APPOINTMENT (OUTPATIENT)
Age: 45
End: 2025-02-25
Payer: COMMERCIAL

## 2025-03-04 ENCOUNTER — TELEPHONE (OUTPATIENT)
Dept: HEMATOLOGY/ONCOLOGY | Facility: CLINIC | Age: 45
End: 2025-03-04
Payer: COMMERCIAL

## 2025-03-04 NOTE — TELEPHONE ENCOUNTER
Bennett did not show up today for his scheduled first Vitamin B-12 injection. I called and left a message requesting that he call us back to reschedule this appointment.

## 2025-03-11 ENCOUNTER — INFUSION (OUTPATIENT)
Dept: HEMATOLOGY/ONCOLOGY | Facility: CLINIC | Age: 45
End: 2025-03-11
Payer: COMMERCIAL

## 2025-03-11 VITALS
RESPIRATION RATE: 18 BRPM | DIASTOLIC BLOOD PRESSURE: 89 MMHG | OXYGEN SATURATION: 97 % | BODY MASS INDEX: 33.79 KG/M2 | HEART RATE: 101 BPM | SYSTOLIC BLOOD PRESSURE: 141 MMHG | WEIGHT: 209.22 LBS | TEMPERATURE: 97 F

## 2025-03-11 DIAGNOSIS — E53.8 VITAMIN B12 DEFICIENCY (NON ANEMIC): ICD-10-CM

## 2025-03-11 PROCEDURE — 96372 THER/PROPH/DIAG INJ SC/IM: CPT

## 2025-03-11 PROCEDURE — 2500000004 HC RX 250 GENERAL PHARMACY W/ HCPCS (ALT 636 FOR OP/ED): Performed by: CLINICAL NURSE SPECIALIST

## 2025-03-11 RX ORDER — EPINEPHRINE 0.3 MG/.3ML
0.3 INJECTION SUBCUTANEOUS EVERY 5 MIN PRN
OUTPATIENT
Start: 2025-03-18

## 2025-03-11 RX ORDER — FAMOTIDINE 10 MG/ML
20 INJECTION, SOLUTION INTRAVENOUS ONCE AS NEEDED
OUTPATIENT
Start: 2025-03-18

## 2025-03-11 RX ORDER — CYANOCOBALAMIN 1000 UG/ML
1000 INJECTION, SOLUTION INTRAMUSCULAR; SUBCUTANEOUS ONCE
Status: COMPLETED | OUTPATIENT
Start: 2025-03-11 | End: 2025-03-11

## 2025-03-11 RX ORDER — DIPHENHYDRAMINE HYDROCHLORIDE 50 MG/ML
50 INJECTION INTRAMUSCULAR; INTRAVENOUS AS NEEDED
OUTPATIENT
Start: 2025-03-18

## 2025-03-11 RX ORDER — CYANOCOBALAMIN 1000 UG/ML
1000 INJECTION, SOLUTION INTRAMUSCULAR; SUBCUTANEOUS ONCE
OUTPATIENT
Start: 2025-03-18

## 2025-03-11 RX ORDER — ALBUTEROL SULFATE 0.83 MG/ML
3 SOLUTION RESPIRATORY (INHALATION) AS NEEDED
OUTPATIENT
Start: 2025-03-18

## 2025-03-11 RX ADMIN — CYANOCOBALAMIN 1000 MCG: 1000 INJECTION, SOLUTION INTRAMUSCULAR; SUBCUTANEOUS at 13:13

## 2025-03-11 ASSESSMENT — COLUMBIA-SUICIDE SEVERITY RATING SCALE - C-SSRS
6. HAVE YOU EVER DONE ANYTHING, STARTED TO DO ANYTHING, OR PREPARED TO DO ANYTHING TO END YOUR LIFE?: NO
1. IN THE PAST MONTH, HAVE YOU WISHED YOU WERE DEAD OR WISHED YOU COULD GO TO SLEEP AND NOT WAKE UP?: NO
2. HAVE YOU ACTUALLY HAD ANY THOUGHTS OF KILLING YOURSELF?: NO

## 2025-03-11 ASSESSMENT — PATIENT HEALTH QUESTIONNAIRE - PHQ9
1. LITTLE INTEREST OR PLEASURE IN DOING THINGS: NOT AT ALL
SUM OF ALL RESPONSES TO PHQ9 QUESTIONS 1 AND 2: 0
2. FEELING DOWN, DEPRESSED OR HOPELESS: NOT AT ALL

## 2025-03-11 ASSESSMENT — ENCOUNTER SYMPTOMS
LOSS OF SENSATION IN FEET: 0
DEPRESSION: 0
OCCASIONAL FEELINGS OF UNSTEADINESS: 0

## 2025-03-11 ASSESSMENT — PAIN SCALES - GENERAL: PAINLEVEL_OUTOF10: 0-NO PAIN

## 2025-03-18 ENCOUNTER — INFUSION (OUTPATIENT)
Dept: HEMATOLOGY/ONCOLOGY | Facility: CLINIC | Age: 45
End: 2025-03-18
Payer: COMMERCIAL

## 2025-03-18 VITALS
WEIGHT: 204.37 LBS | TEMPERATURE: 97.2 F | RESPIRATION RATE: 18 BRPM | HEART RATE: 92 BPM | DIASTOLIC BLOOD PRESSURE: 83 MMHG | SYSTOLIC BLOOD PRESSURE: 134 MMHG | OXYGEN SATURATION: 97 % | BODY MASS INDEX: 33.01 KG/M2

## 2025-03-18 DIAGNOSIS — E53.8 VITAMIN B12 DEFICIENCY (NON ANEMIC): ICD-10-CM

## 2025-03-18 PROCEDURE — 96372 THER/PROPH/DIAG INJ SC/IM: CPT

## 2025-03-18 PROCEDURE — 2500000004 HC RX 250 GENERAL PHARMACY W/ HCPCS (ALT 636 FOR OP/ED): Performed by: CLINICAL NURSE SPECIALIST

## 2025-03-18 RX ORDER — FAMOTIDINE 10 MG/ML
20 INJECTION, SOLUTION INTRAVENOUS ONCE AS NEEDED
OUTPATIENT
Start: 2025-03-25

## 2025-03-18 RX ORDER — EPINEPHRINE 0.3 MG/.3ML
0.3 INJECTION SUBCUTANEOUS EVERY 5 MIN PRN
OUTPATIENT
Start: 2025-03-25

## 2025-03-18 RX ORDER — CYANOCOBALAMIN 1000 UG/ML
1000 INJECTION, SOLUTION INTRAMUSCULAR; SUBCUTANEOUS ONCE
Status: COMPLETED | OUTPATIENT
Start: 2025-03-18 | End: 2025-03-18

## 2025-03-18 RX ORDER — DIPHENHYDRAMINE HYDROCHLORIDE 50 MG/ML
50 INJECTION, SOLUTION INTRAMUSCULAR; INTRAVENOUS AS NEEDED
OUTPATIENT
Start: 2025-03-25

## 2025-03-18 RX ORDER — CYANOCOBALAMIN 1000 UG/ML
1000 INJECTION, SOLUTION INTRAMUSCULAR; SUBCUTANEOUS ONCE
OUTPATIENT
Start: 2025-03-25

## 2025-03-18 RX ORDER — ALBUTEROL SULFATE 0.83 MG/ML
3 SOLUTION RESPIRATORY (INHALATION) AS NEEDED
OUTPATIENT
Start: 2025-03-25

## 2025-03-18 RX ADMIN — CYANOCOBALAMIN 1000 MCG: 1000 INJECTION, SOLUTION INTRAMUSCULAR; SUBCUTANEOUS at 14:01

## 2025-03-18 ASSESSMENT — PAIN SCALES - GENERAL: PAINLEVEL_OUTOF10: 0-NO PAIN

## 2025-03-25 ENCOUNTER — APPOINTMENT (OUTPATIENT)
Dept: HEMATOLOGY/ONCOLOGY | Facility: CLINIC | Age: 45
End: 2025-03-25
Payer: COMMERCIAL

## 2025-03-26 ENCOUNTER — INFUSION (OUTPATIENT)
Dept: HEMATOLOGY/ONCOLOGY | Facility: CLINIC | Age: 45
End: 2025-03-26
Payer: COMMERCIAL

## 2025-03-26 VITALS
OXYGEN SATURATION: 99 % | TEMPERATURE: 97.6 F | RESPIRATION RATE: 18 BRPM | DIASTOLIC BLOOD PRESSURE: 67 MMHG | HEART RATE: 88 BPM | WEIGHT: 204.6 LBS | SYSTOLIC BLOOD PRESSURE: 106 MMHG | BODY MASS INDEX: 33.04 KG/M2

## 2025-03-26 DIAGNOSIS — E53.8 VITAMIN B12 DEFICIENCY (NON ANEMIC): ICD-10-CM

## 2025-03-26 PROCEDURE — 96372 THER/PROPH/DIAG INJ SC/IM: CPT

## 2025-03-26 PROCEDURE — 2500000004 HC RX 250 GENERAL PHARMACY W/ HCPCS (ALT 636 FOR OP/ED): Performed by: CLINICAL NURSE SPECIALIST

## 2025-03-26 RX ORDER — EPINEPHRINE 0.3 MG/.3ML
0.3 INJECTION SUBCUTANEOUS EVERY 5 MIN PRN
OUTPATIENT
Start: 2025-04-01

## 2025-03-26 RX ORDER — DIPHENHYDRAMINE HYDROCHLORIDE 50 MG/ML
50 INJECTION, SOLUTION INTRAMUSCULAR; INTRAVENOUS AS NEEDED
OUTPATIENT
Start: 2025-04-01

## 2025-03-26 RX ORDER — CYANOCOBALAMIN 1000 UG/ML
1000 INJECTION, SOLUTION INTRAMUSCULAR; SUBCUTANEOUS ONCE
Status: COMPLETED | OUTPATIENT
Start: 2025-03-26 | End: 2025-03-26

## 2025-03-26 RX ORDER — ALBUTEROL SULFATE 0.83 MG/ML
3 SOLUTION RESPIRATORY (INHALATION) AS NEEDED
OUTPATIENT
Start: 2025-04-01

## 2025-03-26 RX ORDER — FAMOTIDINE 10 MG/ML
20 INJECTION, SOLUTION INTRAVENOUS ONCE AS NEEDED
OUTPATIENT
Start: 2025-04-01

## 2025-03-26 RX ORDER — CYANOCOBALAMIN 1000 UG/ML
1000 INJECTION, SOLUTION INTRAMUSCULAR; SUBCUTANEOUS ONCE
OUTPATIENT
Start: 2025-04-01

## 2025-03-26 RX ADMIN — CYANOCOBALAMIN 1000 MCG: 1000 INJECTION, SOLUTION INTRAMUSCULAR; SUBCUTANEOUS at 10:22

## 2025-03-26 ASSESSMENT — PAIN SCALES - GENERAL: PAINLEVEL_OUTOF10: 0-NO PAIN

## 2025-04-01 ENCOUNTER — APPOINTMENT (OUTPATIENT)
Dept: HEMATOLOGY/ONCOLOGY | Facility: CLINIC | Age: 45
End: 2025-04-01
Payer: COMMERCIAL

## 2025-04-08 ENCOUNTER — INFUSION (OUTPATIENT)
Dept: HEMATOLOGY/ONCOLOGY | Facility: CLINIC | Age: 45
End: 2025-04-08
Payer: COMMERCIAL

## 2025-04-08 VITALS
OXYGEN SATURATION: 98 % | DIASTOLIC BLOOD PRESSURE: 96 MMHG | WEIGHT: 210.32 LBS | BODY MASS INDEX: 33.97 KG/M2 | TEMPERATURE: 97.3 F | RESPIRATION RATE: 18 BRPM | SYSTOLIC BLOOD PRESSURE: 156 MMHG | HEART RATE: 97 BPM

## 2025-04-08 DIAGNOSIS — E53.8 VITAMIN B12 DEFICIENCY (NON ANEMIC): ICD-10-CM

## 2025-04-08 PROCEDURE — 2500000004 HC RX 250 GENERAL PHARMACY W/ HCPCS (ALT 636 FOR OP/ED)

## 2025-04-08 PROCEDURE — 96372 THER/PROPH/DIAG INJ SC/IM: CPT

## 2025-04-08 RX ORDER — DIPHENHYDRAMINE HYDROCHLORIDE 50 MG/ML
50 INJECTION, SOLUTION INTRAMUSCULAR; INTRAVENOUS AS NEEDED
OUTPATIENT
Start: 2025-04-09

## 2025-04-08 RX ORDER — CYANOCOBALAMIN 1000 UG/ML
1000 INJECTION, SOLUTION INTRAMUSCULAR; SUBCUTANEOUS ONCE
OUTPATIENT
Start: 2025-04-09

## 2025-04-08 RX ORDER — FAMOTIDINE 10 MG/ML
20 INJECTION, SOLUTION INTRAVENOUS ONCE AS NEEDED
OUTPATIENT
Start: 2025-04-09

## 2025-04-08 RX ORDER — ALBUTEROL SULFATE 0.83 MG/ML
3 SOLUTION RESPIRATORY (INHALATION) AS NEEDED
OUTPATIENT
Start: 2025-04-09

## 2025-04-08 RX ORDER — EPINEPHRINE 0.3 MG/.3ML
0.3 INJECTION SUBCUTANEOUS EVERY 5 MIN PRN
OUTPATIENT
Start: 2025-04-09

## 2025-04-08 RX ORDER — CYANOCOBALAMIN 1000 UG/ML
1000 INJECTION, SOLUTION INTRAMUSCULAR; SUBCUTANEOUS ONCE
Status: COMPLETED | OUTPATIENT
Start: 2025-04-08 | End: 2025-04-08

## 2025-04-08 RX ORDER — CYANOCOBALAMIN 1000 UG/ML
INJECTION, SOLUTION INTRAMUSCULAR; SUBCUTANEOUS
Status: COMPLETED
Start: 2025-04-08 | End: 2025-04-08

## 2025-04-08 RX ADMIN — CYANOCOBALAMIN 1000 MCG: 1000 INJECTION, SOLUTION INTRAMUSCULAR; SUBCUTANEOUS at 13:42

## 2025-04-08 ASSESSMENT — PAIN SCALES - GENERAL: PAINLEVEL_OUTOF10: 0-NO PAIN

## 2025-04-09 DIAGNOSIS — L40.50 PSORIATIC ARTHRITIS (MULTI): ICD-10-CM

## 2025-04-10 RX ORDER — SULFASALAZINE 500 MG/1
500 TABLET ORAL 2 TIMES DAILY
Qty: 180 TABLET | Refills: 2 | Status: SHIPPED | OUTPATIENT
Start: 2025-04-10

## 2025-04-15 ENCOUNTER — INFUSION (OUTPATIENT)
Dept: HEMATOLOGY/ONCOLOGY | Facility: CLINIC | Age: 45
End: 2025-04-15
Payer: COMMERCIAL

## 2025-04-15 VITALS
HEART RATE: 101 BPM | BODY MASS INDEX: 33.18 KG/M2 | RESPIRATION RATE: 18 BRPM | SYSTOLIC BLOOD PRESSURE: 116 MMHG | DIASTOLIC BLOOD PRESSURE: 75 MMHG | TEMPERATURE: 97.5 F | OXYGEN SATURATION: 98 % | WEIGHT: 205.47 LBS

## 2025-04-15 DIAGNOSIS — E53.8 VITAMIN B12 DEFICIENCY (NON ANEMIC): ICD-10-CM

## 2025-04-15 PROCEDURE — 2500000004 HC RX 250 GENERAL PHARMACY W/ HCPCS (ALT 636 FOR OP/ED): Performed by: CLINICAL NURSE SPECIALIST

## 2025-04-15 PROCEDURE — 96372 THER/PROPH/DIAG INJ SC/IM: CPT

## 2025-04-15 RX ORDER — DIPHENHYDRAMINE HYDROCHLORIDE 50 MG/ML
50 INJECTION, SOLUTION INTRAMUSCULAR; INTRAVENOUS AS NEEDED
OUTPATIENT
Start: 2025-04-22

## 2025-04-15 RX ORDER — CYANOCOBALAMIN 1000 UG/ML
1000 INJECTION, SOLUTION INTRAMUSCULAR; SUBCUTANEOUS ONCE
OUTPATIENT
Start: 2025-04-22

## 2025-04-15 RX ORDER — ALBUTEROL SULFATE 0.83 MG/ML
3 SOLUTION RESPIRATORY (INHALATION) AS NEEDED
OUTPATIENT
Start: 2025-04-22

## 2025-04-15 RX ORDER — CYANOCOBALAMIN 1000 UG/ML
1000 INJECTION, SOLUTION INTRAMUSCULAR; SUBCUTANEOUS ONCE
Status: COMPLETED | OUTPATIENT
Start: 2025-04-15 | End: 2025-04-15

## 2025-04-15 RX ORDER — FAMOTIDINE 10 MG/ML
20 INJECTION, SOLUTION INTRAVENOUS ONCE AS NEEDED
OUTPATIENT
Start: 2025-04-22

## 2025-04-15 RX ORDER — EPINEPHRINE 0.3 MG/.3ML
0.3 INJECTION SUBCUTANEOUS EVERY 5 MIN PRN
OUTPATIENT
Start: 2025-04-22

## 2025-04-15 RX ADMIN — CYANOCOBALAMIN 1000 MCG: 1000 INJECTION, SOLUTION INTRAMUSCULAR; SUBCUTANEOUS at 14:02

## 2025-04-15 ASSESSMENT — PAIN SCALES - GENERAL: PAINLEVEL_OUTOF10: 0-NO PAIN

## 2025-05-20 ENCOUNTER — APPOINTMENT (OUTPATIENT)
Dept: HEMATOLOGY/ONCOLOGY | Facility: CLINIC | Age: 45
End: 2025-05-20
Payer: COMMERCIAL

## 2025-05-27 ENCOUNTER — APPOINTMENT (OUTPATIENT)
Dept: HEMATOLOGY/ONCOLOGY | Facility: CLINIC | Age: 45
End: 2025-05-27
Payer: COMMERCIAL

## 2025-06-03 ENCOUNTER — APPOINTMENT (OUTPATIENT)
Dept: HEMATOLOGY/ONCOLOGY | Facility: CLINIC | Age: 45
End: 2025-06-03
Payer: COMMERCIAL